# Patient Record
Sex: FEMALE | Race: WHITE
[De-identification: names, ages, dates, MRNs, and addresses within clinical notes are randomized per-mention and may not be internally consistent; named-entity substitution may affect disease eponyms.]

---

## 2017-08-22 ENCOUNTER — HOSPITAL ENCOUNTER (OUTPATIENT)
Dept: HOSPITAL 62 - RAD | Age: 27
End: 2017-08-22
Attending: PHYSICIAN ASSISTANT
Payer: OTHER GOVERNMENT

## 2017-08-22 DIAGNOSIS — M25.561: Primary | ICD-10-CM

## 2017-08-22 NOTE — RADIOLOGY REPORT (SQ)
EXAM DESCRIPTION:  MRI RT LOWER JOINT WITHOUT



COMPLETED DATE/TIME:  8/22/2017 8:29 am



REASON FOR STUDY:  RIGHT KNEE PAIN (M25.561) M25.561  PAIN IN RIGHT KNEE



COMPARISON:  Right knee four views 8/22/2017



TECHNIQUE:  Rightknee images acquired and stored on PACS.  Multiplanar images include fat sensitive s
equences as T1, water sensitive sequences as FST2 or STIR, cartilage sensitive sequences as FSPD, and
 gradient echo sequences.



LIMITATIONS:  Motion artifact



FINDINGS:  JOINT AND BURSAE: No effusion.

BONE CORTEX AND MARROW: On sagittal image 8, axial image 6, and coronal image 13, a benign-appearing 
chondroid lesion is present in the distal right femoral diaphysis, 1.6 cm transverse by 2.3 cm cranio
caudad by 1.5 cm AP.  Minimal matrix calcification on accompanying right knee films today.  This is p
robably a benign chondroid lesion.  Bone scan could be useful for followup.

ACL: Thin, question prior ACL injury

PCL: Intact.

MCL: Intact. No periligamentous edema or fluid.

LCL: Intact. No periligamentous edema or fluid.

MEDIAL MENISCUS: Complex tear mid body and posterior horn medial meniscus.

LATERAL MENISCUS: No tears. No abnormal signal.

MEDIAL COMPARTMENT: Cartilage preserved. No bone bruises or reactive marrow edema. No osteophytes.

LATERAL COMPARTMENT: Cartilage preserved. No bone bruises or reactive marrow edema. No osteophytes.

PATELLA: No chondromalacia. No subchondral cysts. Medial and lateral retinacula intact.

EXTENSOR MECHANISM: Intact. Quadriceps and patella tendons normal.

SOFT TISSUES: Adjacent muscles and subcutaneous tissues normal.  Normal flow void in popliteal artery
 and vein.

OTHER: No other significant finding.



IMPRESSION:  Benign-appearing chondroid lesion in the distal right femoral diaphysis.  Consider bone 
scan for followup.

Thin ACL, suspect prior injury

Complex tear mid body and posterior horn medial meniscus without parameniscal cyst



TECHNICAL DOCUMENTATION:  JOB ID:  [id number]

 [employer]- All Rights Reserved

## 2017-08-22 NOTE — RADIOLOGY REPORT (SQ)
EXAM DESCRIPTION:  KNEE RIGHT 4 VIEWS



COMPLETED DATE/TIME:  8/22/2017 8:32 am



REASON FOR STUDY:  RIGHT KNEE PAIN M25.561  PAIN IN RIGHT KNEE



COMPARISON:  None.



NUMBER OF VIEWS:  Four views.



TECHNIQUE:  AP, lateral, and both oblique radiographic images acquired of the right knee.



LIMITATIONS:  None.



FINDINGS:  MINERALIZATION: Normal.

BONES: No acute fracture or dislocation. No worrisome bone lesions. No significant osteophytes.

JOINT: No effusion.  No chondrocalcinosis.

OTHER: No other significant finding.



IMPRESSION:  NEGATIVE STUDY OF THE RIGHT KNEE. NO EXPLANATION FOR PAIN.



TECHNICAL DOCUMENTATION:  JOB ID:  [id number]

 2011 [employer]- All Rights Reserved

## 2018-01-06 ENCOUNTER — HOSPITAL ENCOUNTER (EMERGENCY)
Dept: HOSPITAL 62 - ER | Age: 28
Discharge: HOME | End: 2018-01-06
Payer: SELF-PAY

## 2018-01-06 VITALS — SYSTOLIC BLOOD PRESSURE: 131 MMHG | DIASTOLIC BLOOD PRESSURE: 74 MMHG

## 2018-01-06 DIAGNOSIS — F17.200: ICD-10-CM

## 2018-01-06 DIAGNOSIS — Z86.14: ICD-10-CM

## 2018-01-06 DIAGNOSIS — R10.2: Primary | ICD-10-CM

## 2018-01-06 LAB
BACTERIA (WET MOUNT): (no result)
CHLAM PCR: DETECTED
EPITHELIALS (WET MOUNT): (no result)
GON PCR: NOT DETECTED
T.VAGINALIS (WET MOUNT): (no result)
WBCS (WET MOUNT): (no result)
YEAST (WET MOUNT): (no result)

## 2018-01-06 PROCEDURE — 99283 EMERGENCY DEPT VISIT LOW MDM: CPT

## 2018-01-06 PROCEDURE — 87210 SMEAR WET MOUNT SALINE/INK: CPT

## 2018-01-06 PROCEDURE — 87591 N.GONORRHOEAE DNA AMP PROB: CPT

## 2018-01-06 PROCEDURE — 87491 CHLMYD TRACH DNA AMP PROBE: CPT

## 2018-01-06 NOTE — ER DOCUMENT REPORT
ED Medical Screen (RME)





- General


Chief Complaint: Foreign Body in Vagina


Stated Complaint: FOREIGN BODY IN VAGINA


Time Seen by Provider: 01/06/18 18:00


Notes: 





Retained tampon.  Lower abdominal pain with malodorous discharge.





I have greeted and performed a rapid initial assessment of this patient.  A 

comprehensive ED assessment and evaluation of the patient, analysis of test 

results and completion of the medical decision making process will be conducted 

by additional ED providers.


TRAVEL OUTSIDE OF THE [place] IN LAST 30 DAYS: No





- Related Data


Allergies/Adverse Reactions: 


 





sulfamethoxazole [From Bactrim] Allergy (Verified 01/06/18 17:11)


 


trimethoprim [From Bactrim] Allergy (Verified 01/06/18 17:11)


 











Past Medical History





- Past Medical History


Cardiac Medical History: 


   Denies: Hx Coronary Artery Disease, Hx Heart Attack, Hx Hypertension


Pulmonary Medical History: 


   Denies: Hx Asthma, Hx Bronchitis, Hx COPD


Neurological Medical History: Reports: Hx Migraine.  Denies: Hx Cerebrovascular 

Accident, Hx Seizures


Renal/ Medical History: Denies: Hx Peritoneal Dialysis


Musculoskeltal Medical History: Denies Hx Arthritis, Reports Hx Musculoskeletal 

Trauma


Skin Medical History: Reports Hx Cellulitis, Reports Hx MRSA


Traumatic Medical History: Reports: Hx Fractures


Past Surgical History: Reports: Hx Breast Surgery - augmentation, Hx Orthopedic 

Surgery - left arm.  Denies: Hx Pacemaker





- Immunizations


Immunizations up to date: Yes


Hx Diphtheria, Pertussis, Tetanus Vaccination: Yes





Physical Exam





- Vital signs


Vitals: 





 











Temp Pulse Resp BP Pulse Ox


 


 98.0 F   68   14   118/70   99 


 


 01/06/18 17:20  01/06/18 17:20  01/06/18 17:20  01/06/18 17:20  01/06/18 17:20














Course





- Vital Signs


Vital signs: 





 











Temp Pulse Resp BP Pulse Ox


 


 98.0 F   68   14   118/70   99 


 


 01/06/18 17:20  01/06/18 17:20  01/06/18 17:20  01/06/18 17:20  01/06/18 17:20

## 2018-02-17 ENCOUNTER — HOSPITAL ENCOUNTER (EMERGENCY)
Dept: HOSPITAL 62 - ER | Age: 28
Discharge: HOME | End: 2018-02-17
Payer: SELF-PAY

## 2018-02-17 VITALS — DIASTOLIC BLOOD PRESSURE: 67 MMHG | SYSTOLIC BLOOD PRESSURE: 113 MMHG

## 2018-02-17 DIAGNOSIS — Z86.14: ICD-10-CM

## 2018-02-17 DIAGNOSIS — Z88.2: ICD-10-CM

## 2018-02-17 DIAGNOSIS — Z88.3: ICD-10-CM

## 2018-02-17 DIAGNOSIS — O21.9: ICD-10-CM

## 2018-02-17 DIAGNOSIS — O20.9: Primary | ICD-10-CM

## 2018-02-17 LAB
ADD MANUAL DIFF: NO
ALBUMIN SERPL-MCNC: 4.4 G/DL (ref 3.5–5)
ALP SERPL-CCNC: 60 U/L (ref 38–126)
ALT SERPL-CCNC: 36 U/L (ref 9–52)
ANION GAP SERPL CALC-SCNC: 12 MMOL/L (ref 5–19)
APPEARANCE UR: (no result)
APTT PPP: YELLOW S
AST SERPL-CCNC: 20 U/L (ref 14–36)
BASOPHILS # BLD AUTO: 0.1 10^3/UL (ref 0–0.2)
BASOPHILS NFR BLD AUTO: 0.8 % (ref 0–2)
BILIRUB DIRECT SERPL-MCNC: 0.4 MG/DL (ref 0–0.4)
BILIRUB SERPL-MCNC: 0.4 MG/DL (ref 0.2–1.3)
BILIRUB UR QL STRIP: NEGATIVE
BUN SERPL-MCNC: 8 MG/DL (ref 7–20)
CALCIUM: 10.1 MG/DL (ref 8.4–10.2)
CHLORIDE SERPL-SCNC: 104 MMOL/L (ref 98–107)
CO2 SERPL-SCNC: 22 MMOL/L (ref 22–30)
EOSINOPHIL # BLD AUTO: 0.2 10^3/UL (ref 0–0.6)
EOSINOPHIL NFR BLD AUTO: 1.9 % (ref 0–6)
ERYTHROCYTE [DISTWIDTH] IN BLOOD BY AUTOMATED COUNT: 13 % (ref 11.5–14)
GLUCOSE SERPL-MCNC: 84 MG/DL (ref 75–110)
GLUCOSE UR STRIP-MCNC: NEGATIVE MG/DL
HCT VFR BLD CALC: 41 % (ref 36–47)
HGB BLD-MCNC: 14 G/DL (ref 12–15.5)
KETONES UR STRIP-MCNC: NEGATIVE MG/DL
LYMPHOCYTES # BLD AUTO: 2.3 10^3/UL (ref 0.5–4.7)
LYMPHOCYTES NFR BLD AUTO: 26.9 % (ref 13–45)
MCH RBC QN AUTO: 29.4 PG (ref 27–33.4)
MCHC RBC AUTO-ENTMCNC: 34.1 G/DL (ref 32–36)
MCV RBC AUTO: 86 FL (ref 80–97)
MONOCYTES # BLD AUTO: 0.6 10^3/UL (ref 0.1–1.4)
MONOCYTES NFR BLD AUTO: 7.3 % (ref 3–13)
NEUTROPHILS # BLD AUTO: 5.4 10^3/UL (ref 1.7–8.2)
NEUTS SEG NFR BLD AUTO: 63.1 % (ref 42–78)
NITRITE UR QL STRIP: NEGATIVE
PH UR STRIP: 6 [PH] (ref 5–9)
PLATELET # BLD: 223 10^3/UL (ref 150–450)
POTASSIUM SERPL-SCNC: 4.1 MMOL/L (ref 3.6–5)
PROT SERPL-MCNC: 7.8 G/DL (ref 6.3–8.2)
PROT UR STRIP-MCNC: NEGATIVE MG/DL
RBC # BLD AUTO: 4.77 10^6/UL (ref 3.72–5.28)
SODIUM SERPL-SCNC: 138.2 MMOL/L (ref 137–145)
SP GR UR STRIP: 1.01
TOTAL CELLS COUNTED % (AUTO): 100 %
UROBILINOGEN UR-MCNC: 4 MG/DL (ref ?–2)
WBC # BLD AUTO: 8.6 10^3/UL (ref 4–10.5)

## 2018-02-17 PROCEDURE — 84702 CHORIONIC GONADOTROPIN TEST: CPT

## 2018-02-17 PROCEDURE — 86850 RBC ANTIBODY SCREEN: CPT

## 2018-02-17 PROCEDURE — 36415 COLL VENOUS BLD VENIPUNCTURE: CPT

## 2018-02-17 PROCEDURE — 86901 BLOOD TYPING SEROLOGIC RH(D): CPT

## 2018-02-17 PROCEDURE — 99284 EMERGENCY DEPT VISIT MOD MDM: CPT

## 2018-02-17 PROCEDURE — 76817 TRANSVAGINAL US OBSTETRIC: CPT

## 2018-02-17 PROCEDURE — 86900 BLOOD TYPING SEROLOGIC ABO: CPT

## 2018-02-17 PROCEDURE — 80053 COMPREHEN METABOLIC PANEL: CPT

## 2018-02-17 PROCEDURE — 85025 COMPLETE CBC W/AUTO DIFF WBC: CPT

## 2018-02-17 PROCEDURE — 81001 URINALYSIS AUTO W/SCOPE: CPT

## 2018-02-17 NOTE — ER DOCUMENT REPORT
ED GI/





- General


Mode of Arrival: Ambulatory


Information source: Patient


TRAVEL OUTSIDE OF THE [place] IN LAST 30 DAYS: No





- HPI


Patient complains to provider of: Pelvic pain, Pregnant, Vaginal bleeding


Onset: Other - States bleeding started earlier today did not even soak 1 pad


Timing/Duration: Better


Quality of pain: Cramping


Severity at maximum: Mild


Severity in ED: None


Pain Level: Denies


Location: Pelvis


Vaginal bleeding (Compared to normal period): Spotting


LMP: 12/15/2017


: 1


Para: 0


ABO type: A


Rh factor: Negative


OB ultrasound done: Yes


Sexual history: Active


Associated symptoms: Other - Mild vaginal bleeding mild cramping


Exacerbated by: Denies


Relieved by: Denies


Similar symptoms previously: No


Recently seen / treated by doctor: Yes





<KYLEE BHAT - Last Filed: 18 05:58>





<DEMETRIUS LUO - Last Filed: 18 19:54>





- General


Chief Complaint: Vaginal Bleeding


Stated Complaint: VAGINAL BLEEDING


Time Seen by Provider: 18 19:28


Notes: 





87-year-old female presented to ED for vaginal bleeding cramping nausea and 

vomiting during early pregnancy.  She states she was told she was between 9 and 

10 weeks pregnant.  States her last menstrual period was on December 15, 2017 

she is a first-time pregnancy.  She states she has been having spotting but no 

heavy bleeding.  She states she has had nausea and vomiting not able to keep 

anything down. (KYLEE BHAT)





Patient is 27 year old, not 87.  (DEMETRIUS LUO)





- Related Data


Allergies/Adverse Reactions: 


 





Sulfa (Sulfonamide Antibiotics) Allergy (Verified 18 18:17)


 


sulfamethoxazole [From Bactrim] Allergy (Verified 18 17:11)


 


trimethoprim [From Bactrim] Allergy (Verified 18 17:11)


 











Past Medical History





- General


Information source: Patient





- Social History


Smoking Status: Former Smoker


Cigarette use (# per day): No


Chew tobacco use (# tins/day): No


Smoking Education Provided: No


Frequency of alcohol use: None


Drug Abuse: None


Lives with: Family


Family History: Hyperlipidemia, Hypertension, Malignancy.  denies: Arthritis, 

CAD, COPD, CVA, DM, Thyroid Disfunction


Patient has suicidal ideation: No


Patient has homicidal ideation: No





- Past Medical History


Cardiac Medical History: Reports: None


Pulmonary Medical History: Reports: None


EENT Medical History: Reports: None


Neurological Medical History: Reports: Hx Migraine


Endocrine Medical History: Reports: None


Renal/ Medical History: Reports: None


Malignancy Medical History: Reports: None


GI Medical History: Reports: None


Musculoskeltal Medical History: Reports Hx Musculoskeletal Trauma


Skin Medical History: Reports Hx Cellulitis, Reports Hx MRSA


Psychiatric Medical History: Reports: None


Traumatic Medical History: Reports: Hx Fractures


Infectious Medical History: Reports: None


Past Surgical History: Reports: Hx Breast Surgery - augmentation, Hx Orthopedic 

Surgery - left arm





- Immunizations


Immunizations up to date: Yes


Hx Diphtheria, Pertussis, Tetanus Vaccination: Yes





<KYLEE BAHT - Last Filed: 18 05:58>





Review of Systems





<KYLEE BHAT - Last Filed: 18 05:58>





<DEMETRIUS LUO - Last Filed: 18 19:54>





- Review of Systems


Notes: 





Constitutional: [PRESENT: as per HPI.  ABSENT: chills, fever(s), headache(s), 

weight gain, weight loss]


Eyes: [ABSENT: visual disturbances]


Ears: [ABSENT: hearing changes]


Cardiovascular: [ABSENT: chest pain, dyspnea on exertion, edema, orthropnea, 

palpitations]


Respiratory: [ABSENT: cough, hemoptysis]


Gastrointestinal: [ABSENT: abdominal pain, constipation, diarrhea, hematemesis, 

hematochezia, nausea, vomiting]


Genitourinary: Mild vaginal bleeding, pelvic pain, pregnant


Musculoskeletal: [ABSENT: joint swelling]


Integumentary: [ABSENT: rash, wounds]


Neurological: [ABSENT: abnormal gait, abnormal speech, confusion, dizziness, 

focal weakness, syncope]


Psychiatric: [ABSENT: anxiety, depression, homicidal ideation, suicidal ideation

]


Endocrine: [ABSENT: cold intolerance, heat intolerance, menstrual abnormalities

, polydipsia, polyuria]


Hematologic/Lymphatic: [ABSENT: easy bleeding, easy bruising, lymphadenopathy] (

KYLEE BHAT)





Physical Exam





<KYLEE BHAT - Last Filed: 18 05:58>





<DEMETRIUS LUO - Last Filed: 18 19:54>





- Vital signs


Vitals: 





 











Temp Pulse Resp BP Pulse Ox


 


 98.1 F   74   16   117/66   100 


 


 18 18:43  18 18:43  18 18:43  18 18:43  18 18:43














- Notes


Notes: 





PHYSICAL EXAMINATION:





GENERAL: Well-appearing, well-nourished and in no acute distress.





HEAD: Atraumatic, normocephalic.





EYES: Pupils equal round and reactive to light, extraocular movements intact, 

conjunctiva are normal.





ENT: Nares patent, oropharynx clear without exudates.  Moist mucous membranes.





NECK: Normal range of motion, supple without lymphadenopathy





LUNGS: Breath sounds clear to auscultation bilaterally and equal.  No wheezes 

rales or rhonchi.





HEART: Regular rate and rhythm without murmurs





ABDOMEN: Soft, nontender, nondistended abdomen.  No guarding, no rebound.  No 

masses appreciated.





Female : deferred she stated bleeding had stopped she was not having any more 

pain.





Musculoskeletal: Normal range of motion, no pitting or edema.  No cyanosis.





NEUROLOGICAL: Cranial nerves grossly intact.  Normal speech, normal gait.  

Normal sensory, motor exams





PSYCH: Normal mood, normal affect.





SKIN: Warm, Dry, normal turgor, no rashes or lesions noted. (KYLEE BHAT)





Course





- Laboratory


Result Diagrams: 


 18 19:48





 18 19:48





- Diagnostic Test


Radiology reviewed: Image reviewed, Reports reviewed





<KYLEE BHAT - Last Filed: 18 05:58>





- Laboratory


Result Diagrams: 


 18 19:48





 18 19:48





<DEMETRIUS LUO - Last Filed: 18 19:54>





- Re-evaluation


Re-evalutation: 





18 06:03


Labs and ultrasound discussed with patient.  Patient is A- blood type so she 

was received RhoGam with no complications.  Patient was instructed to follow-up 

with her OB/GYN.  She stated that her bleeding had stopped before I saw her in 

the emergency room.  Patient received instructions on when to follow-up with 

the emergency room, what medication she could and could not take, and 

medication she could use for nausea and vomiting.  Patient verbalized 

understanding of her instructions.  When asked did she have any further 

questions or concerns she replied she did not. (KYLEE BHAT)





- Vital Signs


Vital signs: 





 











Temp Pulse Resp BP Pulse Ox


 


 98.4 F   68   16   113/67   100 


 


 18 21:10  18 21:10  18 21:10  18 21:10  18 21:10














- Laboratory


Laboratory results interpreted by me: 





 











  18





  19:48 19:48


 


Beta HCG, Quant  51902.00 H 


 


Urine Urobilinogen   4.0 H














Discharge





<KYLEE BHAT - Last Filed: 18 05:58>





<DEMETRIUS LUO - Last Filed: 18 19:54>





- Discharge


Clinical Impression: 


 Vaginal bleeding affecting early pregnancy





Condition: Stable


Disposition: HOME, SELF-CARE


Instructions:  [facility]


Additional Instructions: 


PREGNANCY:





     You are pregnant.  Prenatal care is best started as early in pregnancy as 

possible.


     If you're unsure about continuing this pregnancy, you should discuss this 

with your physician or with counsellors at [facility].


     You should take only medications approved by your physician. Acetaminophen 

can safely be taken for minor pains.  As a rule, medication for chronic 

conditions such as asthma or seizures can safely be continued.  You should 

discuss with the physician every medicine you take.


     Any regular exercise program can be continued.  Talk to your physician, 

however, before engaging in competitive or demanding sports.


     Alcohol, smoking, and "street drugs" are dangerous to your baby. Cocaine 

is especially dangerous.  Don't use any illicit drugs!








BLEEDING DURING EARLY PREGNANCY:





     You have been evaluated for passing blood while pregnant. While we take 

this symptom very seriously, most women with your degree of bleeding will go on 

to have a perfectly normal baby. At this time, there is no indication that a 

miscarriage will occur. (A miscarriage occurs when the fetus is abnormal.  

There is no medicine or treatment to prevent it.)


     A more serious cause of bleeding is tubal (or ectopic) pregnancy. An 

ultrasound usually can show whether the pregnancy is in the uterus or in the 

tube. Sometimes in early pregnancy, no fetus is seen. In this case, careful 

follow-up, including repeat blood tests and repeat ultrasound, is necessary.


     Do not douche or have sex for at least a week, or until OK'd by the 

doctor. Don't use tampons.


     Call the doctor or return for re-examination if there is an increase in 

bleeding or cramping, extreme weakness, fainting, new abdominal pain, fever, or 

passage of tissue.








RHOGAM:


   Rhogam is given to a pregnant woman who has Rh negative blood type when she 

has vaginal bleeding during her pregnancy or at the time of the delivery of her 

baby.  If a woman is Rh negative, her body will form antibodies against red 

blood cells from an Rh positive fetus or baby that mix with her blood during a 

threatened or actual miscarraige or delivery.  These antibodies will remain in 

the woman's body forever and will attack any future Rh positive fetus 

preventing it from developing into a normal baby.  Rhogam is given to prevent 

the mother's body from forming these antibodies.





Vitamin B6 can help with your nausea you can buy that over-the-counter.





Copy of your labs and ultrasound were given to you to follow-up with the health 

department.





FOLLOW-UP CARE:


If you have been referred to a physician for follow-up care, call the physician

s office for an appointment as you were instructed or within the next two days.

  If you experience worsening or a significant change in your symptoms (very 

heavy bleeding with large clots of blood, passage of tissue, more severe 

abdominal / pelvic pain or cramping, feeling faint or severe weakness, fever, 

etc.), notify the physician immediately or return to the Emergency Department 

at any time for re-evaluation.





OBSTETRIC-GYNECOLOGIC (OB-GYN) PHYSICIANS IN [place]:








[facility]


    [address] [place] [address]


    [phone]





For active duty and  dependents diagnosed with a threatened  or 

miscarriage, you should follow up in the following manner:





      Standard patients who have a local civilian provider should follow 

up with that provider.





      Patients of the Family Practice Clinic should call your Team Nurse at 8:

00 am the following morning for further instructions.





     If you are neither a  Standard patient nor a patient of the Family 

Practice Clinic, you should follow up at the [facility] ([facility])

.  Patients already enrolled in the [facility],  Prime patients not 

assigned to the Family Practice Clinic, and Active Duty patients not assigned 

to Family Practice Clinic should report to the [facility] at 8:00 am the next 

morning that the [facility] is open and then you will be seen in the OB 

Clinic at 11:00 am.


Forms:  Return to Work

## 2018-02-17 NOTE — RADIOLOGY REPORT (SQ)
EXAM DESCRIPTION:  U/S OB TRANSVAGINAL W/O DOP



COMPLETED DATE/TIME:  2/17/2018 8:16 pm



REASON FOR STUDY:  Pregnant vaginal bleeding cramping first pregnancy



COMPARISON:  None.



TECHNIQUE:  Transvaginal static and realtime grayscale images acquired of the pelvis. Additional charles
cted spectral and color Doppler images recorded. All images stored on PACs.

bHCG: Pending.



LIMITATIONS:  None.



FINDINGS:  FETUS: Living intrauterine pregnancy.

EGA: 6 week 6 day.

STEPHANIA: 10/7/2018.

FHR: 136  beats per minute.

SUBCHORIONIC BLEED: No.

SIZE OF BLEED: Not applicable.

UTERUS: No masses. No anomalies.

CERVICAL LENGTH: 2.8 cm.   Closed.

RIGHT ADNEXA: Ovary not identified.

No adnexal free fluid.

No adnexal masses.

LEFT ADNEXA: Normal ovary with normal vascular flow.

No adnexal free fluid.

No adnexal masses.

FREE FLUID: Trace cul-de-sac fluid.

OTHER: No other significant finding.



IMPRESSION:  LIVING INTRAUTERINE PREGNANCY.

EGA 6 week 6 day.

Trimester of pregnancy:  First - 0 to 13 weeks.



TECHNICAL DOCUMENTATION:  JOB ID:  [id number]

 2011 [employer]- All Rights Reserved

## 2018-02-17 NOTE — ER DOCUMENT REPORT
ED Medical Screen (RME)





- General


Chief Complaint: Vaginal Bleeding


Stated Complaint: VAGINAL BLEEDING


Time Seen by Provider: 18 19:28


Mode of Arrival: Ambulatory


Information source: Patient


Notes: 





27-year-old female presents to ED for vaginal bleeding cramping nausea and 

vomiting during early pregnancy.  She states she has been told she is between 9 

and 10 weeks pregnant.  Last menstrual period was December 15.   1 para 

0.  She states she has had spotting no heavy bleeding is not able to keep any 

food down dry heaving nausea.  She states this is his first pregnancy so she is 

not sure before she is feeling was normal but she "just does not feel right".











I have greeted and performed a rapid initial assessment of this patient.  A 

comprehensive ED assessment and evaluation of the patient, analysis of test 

results and completion of medical decision making process will be conducted by 

an additional ED providers.


TRAVEL OUTSIDE OF THE [place] IN LAST 30 DAYS: No





- Related Data


Allergies/Adverse Reactions: 


 





Sulfa (Sulfonamide Antibiotics) Allergy (Verified 18 18:17)


 


sulfamethoxazole [From Bactrim] Allergy (Verified 18 17:11)


 


trimethoprim [From Bactrim] Allergy (Verified 18 17:11)


 











Past Medical History





- Past Medical History


Cardiac Medical History: 


   Denies: Hx Coronary Artery Disease, Hx Heart Attack, Hx Hypertension


Pulmonary Medical History: 


   Denies: Hx Asthma, Hx Bronchitis, Hx COPD


Neurological Medical History: Reports: Hx Migraine.  Denies: Hx Cerebrovascular 

Accident, Hx Seizures


Renal/ Medical History: Denies: Hx Peritoneal Dialysis


Musculoskeltal Medical History: Denies Hx Arthritis, Reports Hx Musculoskeletal 

Trauma


Skin Medical History: Reports Hx Cellulitis, Reports Hx MRSA


Traumatic Medical History: Reports: Hx Fractures


Past Surgical History: Reports: Hx Breast Surgery - augmentation, Hx Orthopedic 

Surgery - left arm.  Denies: Hx Pacemaker





- Immunizations


Immunizations up to date: Yes


Hx Diphtheria, Pertussis, Tetanus Vaccination: Yes





Physical Exam





- Vital signs


Vitals: 





 











Temp Pulse Resp BP Pulse Ox


 


 98.1 F   74   16   117/66   100 


 


 18 18:43  18 18:43  18 18:43  18 18:43  18 18:43














Course





- Vital Signs


Vital signs: 





 











Temp Pulse Resp BP Pulse Ox


 


 98.1 F   74   16   117/66   100 


 


 18 18:43  18 18:43  18 18:43  18 18:43  18 18:43

## 2018-06-03 ENCOUNTER — HOSPITAL ENCOUNTER (EMERGENCY)
Dept: HOSPITAL 62 - ER | Age: 28
Discharge: HOME | End: 2018-06-03
Payer: SELF-PAY

## 2018-06-03 VITALS — DIASTOLIC BLOOD PRESSURE: 69 MMHG | SYSTOLIC BLOOD PRESSURE: 115 MMHG

## 2018-06-03 DIAGNOSIS — Z86.14: ICD-10-CM

## 2018-06-03 DIAGNOSIS — F17.200: ICD-10-CM

## 2018-06-03 DIAGNOSIS — G44.211: Primary | ICD-10-CM

## 2018-06-03 PROCEDURE — 99284 EMERGENCY DEPT VISIT MOD MDM: CPT

## 2018-06-03 PROCEDURE — 81025 URINE PREGNANCY TEST: CPT

## 2018-06-03 NOTE — ER DOCUMENT REPORT
HPI





- HPI


Pain Level: 2


Context: 





Patient is a 27-year-old female who presents emergency department the chief 

complaint of headaches and stress.  Patient states that she checked in today 

for a work note.  Patient states that her job told her she needs to be 

evaluated so she does not lose her job.  She states that she has these 

headaches frequently and that she has a history of tension headaches.  Patient 

states that she takes Tylenol for them in the past with complete resolution.  

Denies any vision changes, associated nausea, vomiting.  Regarding her noted 

complaint of stomach pain.  Patient states that she has had some intermittent 

nausea but she states that this is mostly when she skips meals at work and 

related to stress.  She denies any abdominal pain.





- REPRODUCTIVE


Reproductive: DENIES: Pregnant:





Past Medical History





- Social History


Smoking Status: Current Every Day Smoker


Family History: Hyperlipidemia, Hypertension, Malignancy.  denies: Arthritis, 

CAD, COPD, CVA, DM, Thyroid Disfunction





- Past Medical History


Cardiac Medical History: 


   Denies: Hx Coronary Artery Disease, Hx Heart Attack, Hx Hypertension


Pulmonary Medical History: 


   Denies: Hx Asthma, Hx Bronchitis, Hx COPD


Neurological Medical History: Reports: Hx Migraine.  Denies: Hx Cerebrovascular 

Accident, Hx Seizures


Renal/ Medical History: Denies: Hx Peritoneal Dialysis


Musculoskeltal Medical History: Denies Hx Arthritis, Reports Hx Musculoskeletal 

Trauma


Skin Medical History: Reports Hx Cellulitis, Reports Hx MRSA


Traumatic Medical History: Reports: Hx Fractures


Past Surgical History: Reports: Hx Breast Surgery - augmentation, Hx Orthopedic 

Surgery - left arm.  Denies: Hx Pacemaker





- Immunizations


Immunizations up to date: Yes


Hx Diphtheria, Pertussis, Tetanus Vaccination: Yes





Vertical Provider Document





- CONSTITUTIONAL


Agree With Documented VS: Yes


Notes: 





PHYSICAL EXAM


GENERAL: Alert, interacts well. 


Head: Atraumatic normocephalic


ABDOMEN: Soft,  nondistended, nontender. No guarding, rebound, or rigidity.. 

Bowel sounds present in all 4 quadrants.


EXTREMITIES: Moves all 4 extremities spontaneously. No edema, radial and 

dorsalis pedis pulses 2/4 bilaterally. No cyanosis. 


NEUROLOGICAL: Alert and oriented x4.  Extraocular motions intact.  Pupils are 2 

mm and equally reactive.  Normal speech, normal gait.  5 out of 5 strength in 

both the distal and proximal upper and lower extremities bilaterally.  

Sensation is grossly intact throughout.  


PSYCH: Normal affect, normal mood.


SKIN: Warm, dry, normal turgor. No rashes or lesions noted.








- INFECTION CONTROL


TRAVEL OUTSIDE OF THE [place] IN LAST 30 DAYS: No





Course





- Re-evaluation


Re-evalutation: 





06/03/18 12:50


Patient does not have any focal neurologic deficits, nuchal rigidity, vital 

signs are within normal limits no papilledema.  Patient is otherwise no acute 

distress and hemodynamically stable.  Low index for suspicion of acute 

subarachnoid hemorrhage, meningitis or mass.  Low suspicion for acute life-

threatening etiology with intact neuro exam therefore no additional imaging or 

laboratory testing is indicated.  Will discharge patient home with strict follow

-up with PCP











- Vital Signs


Vital signs: 


 











Temp Pulse Resp BP Pulse Ox


 


 98.2 F   84   16   115/69   99 


 


 06/03/18 12:46  06/03/18 12:46  06/03/18 12:46  06/03/18 12:46  06/03/18 12:46














Discharge





- Discharge


Clinical Impression: 


Headache


Qualifiers:


 Headache type: tension-type Headache chronicity pattern: episodic headache 

Intractability: intractable Qualified Code(s): G44.211 - Episodic tension-type 

headache, intractable





Condition: Good


Disposition: HOME, SELF-CARE


Additional Instructions: 


HEADACHE:





     The physician does not feel that the headache you are experiencing has a 

serious underlying cause.  Most headaches are due to emotional stress, with 

resultant muscle tension (tension headache). Occasionally, headaches are 

secondary to changes in the blood vessels of the scalp (vascular headache and 

migraine headache).  Sometimes, a headache is the first symptom of another 

developing illness, such as a viral infection.  You have no evidence of stroke, 

bleeding, meningitis, or other serious cause of your headache.


     The treatment of headaches varies with the severity and cause of the pain.

  Not all headaches need pain shots.  In fact, there is evidence that using 

narcotics for headaches may make them worse in the long run.  The physician 

will determine the therapy that's in your best interest.


     If you develop a fever, if the headache is different from any you've 

previously experienced, or if the headache progressively worsens, then call 

your physician at once or go to the emergency room.








FOLLOW-UP CARE:


If you have been referred to a physician for follow-up care, call the physician

s office for an appointment as you were instructed or within the next two days.

  If you experience worsening or a significant change in your symptoms, notify 

the physician immediately or return to the Emergency Department at any time for 

re-evaluation.


Prescriptions: 


Butalb/Acetaminophen/Caffeine [Fioricet (-40 mg) Tablet] 1 - 2 tab PO Q4H 

#20 tab


Forms:  Return to Work

## 2018-06-14 ENCOUNTER — HOSPITAL ENCOUNTER (EMERGENCY)
Dept: HOSPITAL 62 - ER | Age: 28
Discharge: HOME | End: 2018-06-14
Payer: COMMERCIAL

## 2018-06-14 VITALS — SYSTOLIC BLOOD PRESSURE: 135 MMHG | DIASTOLIC BLOOD PRESSURE: 78 MMHG

## 2018-06-14 DIAGNOSIS — S39.012A: Primary | ICD-10-CM

## 2018-06-14 DIAGNOSIS — F17.200: ICD-10-CM

## 2018-06-14 DIAGNOSIS — Z88.2: ICD-10-CM

## 2018-06-14 DIAGNOSIS — Z86.14: ICD-10-CM
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
## 2018-06-14 DIAGNOSIS — Z88.3: ICD-10-CM

## 2018-06-14 DIAGNOSIS — M54.6: ICD-10-CM

## 2018-06-14 PROCEDURE — 72110 X-RAY EXAM L-2 SPINE 4/>VWS: CPT

## 2018-06-14 PROCEDURE — 99283 EMERGENCY DEPT VISIT LOW MDM: CPT

## 2018-06-14 PROCEDURE — 72070 X-RAY EXAM THORAC SPINE 2VWS: CPT
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
## 2018-09-30 NOTE — ER DOCUMENT REPORT
HPI





- HPI


Patient complains to provider of: Dental pain


Onset: Other - 3 days


Onset/Duration: Persistent


Quality of pain: Achy


Pain Level: 4


Context: 





Patient presents complaining of a 3-day history of dental pain to her wisdom 

teeth on the left upper and lower side.  Patient denies any fever.  Patient 

does complain of left ear pain.


Associated Symptoms: Other - Dental pain.  denies: Fever, Nausea, Vomiting


Exacerbated by: Denies


Relieved by: Denies


Similar symptoms previously: Yes


Recently seen / treated by doctor: No





- ROS


ROS below otherwise negative: Yes


Systems Reviewed and Negative: Yes All other systems reviewed and negative





- CONSTITUTIONAL


Constitutional: DENIES: Fever, Chills





- EENT


EENT: REPORTS: Ear Pain





- NEURO


Neurology: DENIES: Headache





- GASTROINTESTINAL


Gastrointestinal: DENIES: Patient vomiting





- REPRODUCTIVE


Reproductive: DENIES: Pregnant:





- DERM


Skin Color: Normal


Skin Problems: None





Past Medical History





- General


Information source: Patient





- Social History


Smoking Status: Never Smoker


Frequency of alcohol use: None


Drug Abuse: None


Occupation: Foodservice


Lives with: Family


Family History: Hyperlipidemia, Hypertension, Malignancy.  denies: Arthritis, 

CAD, COPD, CVA, DM, Thyroid Disfunction


Pulmonary Medical History: 


   Denies: Hx Asthma, Hx Bronchitis, Hx COPD


Neurological Medical History: Reports: Hx Migraine


Renal/ Medical History: Denies: Hx Peritoneal Dialysis


Musculoskeletal Medical History: Denies Hx Arthritis, Reports Hx 

Musculoskeletal Trauma


Skin Medical History: Reports Hx Cellulitis, Reports Hx MRSA


Traumatic Medical History: Reports: Hx Fractures


Past Surgical History: Reports: Hx Breast Surgery - augmentation, Hx Orthopedic 

Surgery - left arm





- Immunizations


Immunizations up to date: Yes


Hx Diphtheria, Pertussis, Tetanus Vaccination: Yes





Vertical Provider Document





- CONSTITUTIONAL


Agree With Documented VS: Yes


Exam Limitations: No Limitations


General Appearance: WD/WN, No Apparent Distress





- INFECTION CONTROL


TRAVEL OUTSIDE OF THE [place] IN LAST 30 DAYS: No





- HEENT


HEENT: Atraumatic, Normocephalic.  negative: Pharyngeal Exudate, Pharyngeal 

Tenderness, Pharyngeal Erythema, Tympanic Membrane Red, Tympanic Membrane 

Bulging


Mouth Diagram: 


  __________________________














  __________________________





 1 - Tenderness, with gingival inflammation, no obvious abscess.








- NECK


Neck: Normal Inspection, Supple.  negative: Lymphadenopathy-Left, 

Lymphadenopathy-Right





- RESPIRATORY


Respiratory: Breath Sounds Normal, No Respiratory Distress





- CARDIOVASCULAR


Cardiovascular: Regular Rate, Regular Rhythm





- BACK


Back: Normal Inspection





- MUSCULOSKELETAL/EXTREMETIES


Musculoskeletal/Extremeties: MAEW





- NEURO


Level of Consciousness: Awake, Alert, Appropriate


Motor/Sensory: No Motor Deficit





- DERM


Integumentary: Warm, Dry





Course





- Vital Signs


Vital signs: 


 











Temp Pulse Resp BP Pulse Ox


 


 98.7 F   69   20   128/79 H  100 


 


 09/30/18 20:17  09/30/18 20:17  09/30/18 20:17  09/30/18 20:17  09/30/18 20:17














Discharge





- Discharge


Clinical Impression: 


 Toothache





Condition: Stable


Disposition: HOME, SELF-CARE


Instructions:  Penicillin V K ([facility]), Toothache ([facility])


Additional Instructions: 


Return immediately for any new or worsening symptoms





Followup with your primary care provider, call tomorrow to make a followup 

appointment





Follow-up with a dental care provider for further evaluation


Prescriptions: 


Naproxen [Naprosyn 250 Nmg Tablet] 1 tab PO BID #14 tablet


Penicillin V Potassium [Penicillin Vk 500 mg Tablet] 500 mg PO BID #20 tablet


Referrals: 


[facility] [Provider Group] - Follow up as needed

## 2018-12-11 NOTE — ER DOCUMENT REPORT
ED Skin Rash/Insect Bite/Abscs





- General


Chief Complaint: Rash


Stated Complaint: RASH


Time Seen by Provider: 12/10/18 23:50


Mode of Arrival: Ambulatory


Information source: Patient, Relative


Notes: 


Patient is a 20-year-old female comes emergency room complaining of a 

generalized rash.  Patient states she works at [employer] noticed that yesterday 

at work she started breaking out on her right forearm and is now travel around 

to her left arm her upper legs and her abdomen and back.  Patient states she is 

taken Benadryl without any relief of the itching.  She does not know any 

association to anything that would cause this.  She only has an allergy to 

sulfa.  Currently patient states she is really itchy and the redness has turned 

to hives.  She denies any shortness of breath or any swelling of the lips or 

tongue.


TRAVEL OUTSIDE OF THE [place] IN LAST 30 DAYS: No





- HPI


Patient complains to provider of: Skin rash/lesion


Onset: Yesterday


Onset/Duration: Sudden


Quality of pain: Other - Itchy


Severity: Moderate


Pain Level: 3


Skin Character: Rash, Urticarial


Skin Temperature: Warm


Quality of rash: Itchy


Identify cause: No


Exacerbated by: Denies


Relieved by: Denies


Similar symptoms previously: No


Recently seen / treated by doctor: No





- Related Data


Allergies/Adverse Reactions: 


 





Sulfa (Sulfonamide Antibiotics) Allergy (Verified 09/30/18 19:59)


 


sulfamethoxazole [From Bactrim] Allergy (Verified 09/30/18 19:59)


 


trimethoprim [From Bactrim] Allergy (Verified 09/30/18 19:59)


 











Past Medical History





- General


Information source: Patient





- Social History


Smoking Status: Current Some Day Smoker


Cigarette use (# per day): Yes - Occasional


Chew tobacco use (# tins/day): No


Smoking Education Provided: Yes


Frequency of alcohol use: Social


Drug Abuse: None


Family History: Hyperlipidemia, Hypertension, Malignancy.  denies: Arthritis, 

CAD, COPD, CVA, DM, Thyroid Disfunction


Patient has suicidal ideation: No


Patient has homicidal ideation: No





- Past Medical History


Cardiac Medical History: 


   Denies: Hx Coronary Artery Disease, Hx Heart Attack, Hx Hypertension


Pulmonary Medical History: 


   Denies: Hx Asthma, Hx Bronchitis, Hx COPD


Neurological Medical History: Reports: Hx Migraine.  Denies: Hx Cerebrovascular 

Accident, Hx Seizures


Renal/ Medical History: Denies: Hx Peritoneal Dialysis


Musculoskeletal Medical History: Denies Hx Arthritis, Reports Hx 

Musculoskeletal Trauma


Skin Medical History: Reports Hx Cellulitis, Reports Hx MRSA


Traumatic Medical History: Reports: Hx Fractures


Past Surgical History: Reports: Hx Breast Surgery - augmentation, Hx Orthopedic 

Surgery - left arm.  Denies: Hx Pacemaker





- Immunizations


Immunizations up to date: Yes


Hx Diphtheria, Pertussis, Tetanus Vaccination: Yes





Review of Systems





- Review of Systems


Constitutional: No symptoms reported


EENT: No symptoms reported


Cardiovascular: No symptoms reported


Respiratory: No symptoms reported


Gastrointestinal: No symptoms reported


Genitourinary: No symptoms reported


Female Genitourinary: No symptoms reported


Musculoskeletal: No symptoms reported


Skin: See HPI, Rash


Hematologic/Lymphatic: No symptoms reported


Neurological/Psychological: No symptoms reported


-: Yes All other systems reviewed and negative





Physical Exam





- Vital signs


Vitals: 





 











Temp Pulse Resp BP Pulse Ox


 


 97.9 F   71   17   122/71   99 


 


 12/10/18 21:14  12/10/18 21:14  12/10/18 21:14  12/10/18 21:14  12/10/18 21:14











Interpretation: Normal





- Notes


Notes: 





PHYSICAL EXAMINATION:





GENERAL: Well-appearing, well-nourished and in no acute distress.  But 

uncomfortable appearing





HEAD: Atraumatic, normocephalic.





EYES: Pupils equal round and reactive to light, extraocular movements intact, 

conjunctiva are normal.





ENT: Nares patent, oropharynx clear without exudates.  Moist mucous membranes.  

No sign of swelling of the tongue or lips.  Patient handling secretions without 

problem.





NECK: Normal range of motion, supple without lymphadenopathy





LUNGS: Breath sounds clear to auscultation bilaterally and equal.  No wheezes 

rales or rhonchi.





HEART: Regular rate and rhythm without murmurs





ABDOMEN: Soft, nontender, nondistended abdomen.  No guarding, no rebound.  No 

masses appreciated.





Female : deferred





Musculoskeletal: Normal range of motion, no pitting or edema.  No cyanosis.





NEUROLOGICAL:   Normal speech, normal gait.  Normal sensory, motor exams





PSYCH: Normal mood, normal affect.





SKIN: Warm, further examination of patient's rash shows her to be ureteric type 

of a presentation more on the right arm and left arm upper thighs and some on 

the back.  It is starting to fade currently and is at times hard to 

differentiate between that and patient's natural skin tone.





Course





- Re-evaluation


Re-evalutation: 





12/11/18 00:46


We gave patient the Decadron, Benadryl, and Pepcid here.  She responded fairly 

well itching got slightly better.  I am going to send her home on a steroid 

taper some Zantac and Atarax.





- Vital Signs


Vital signs: 





 











Temp Pulse Resp BP Pulse Ox


 


 97.9 F   71   17   122/71   99 


 


 12/10/18 21:14  12/10/18 21:14  12/10/18 21:14  12/10/18 21:14  12/10/18 21:14














Discharge





- Discharge


Clinical Impression: 


Allergic reaction


Qualifiers:


 Encounter type: initial encounter Qualified Code(s): T78.40XA - Allergy, 

unspecified, initial encounter





Condition: Stable


Disposition: HOME, SELF-CARE


Instructions:  Acute Allergic Reaction ([facility])


Additional Instructions: 


Home tonight and rest.  Medication as prescribed.  Take it off to extended.  

You do not have to take Benadryl if you get the Atarax.  The Atarax is actually 

called hydroxyzine same medication.


Prescriptions: 


Hydroxyzine Pamoate [Vistaril 25 mg Capsule] 25 mg PO DAILY #30 capsule


Prednisone 10 mg PO ASDIR PRN 6 Days #1 tab.[id number].pk


 PRN Reason: 


Ranitidine HCl 150 mg PO BID #30 tablet


Forms:  Return to Work


Referrals: 


[facility] [NO LOCAL MD] - Follow up as needed

## 2019-01-29 NOTE — ER DOCUMENT REPORT
HPI





- HPI


Time Seen by Provider: 01/29/19 11:06


Pain Level: 4


Notes: 





Patient is a 28-year-old female who presents to the ED complaining of nasal 

congestion/discharge, dry nonproductive cough, fever, body ache 1 day.  Patient

states that she is still eating and drinking without difficulties, but does have

a decreased p.o. intake.  She is still urinating normally having normal bowel 

movements.  Patient has been using some over-the-counter meds for symptoms.  She

denies any significant past medical history including cardiopulmonary history 

and immunocompromised conditions.  Patient denies any smoking or IV drug use.  

Patient requesting work note.  Denies any current headache, neck pain, sore 

throat, chest pain, palpitations, syncope, shortness of breath, wheeze, dyspnea,

abdominal pain, nausea/vomiting/diarrhea, urinary retention, dysuria, hematuria,

or rash.





- ROS


Systems Reviewed and Negative: Yes All other systems reviewed and negative





- CONSTITUTIONAL


Constitutional: REPORTS: Fever, Chills





- NEURO


Neurology: REPORTS: Headache





- RESPIRATORY


Respiratory: REPORTS: Coughing





- REPRODUCTIVE


Reproductive: DENIES: Pregnant:





Past Medical History





- Social History


Smoking Status: Former Smoker


Frequency of alcohol use: Social


Drug Abuse: None


Family History: Hyperlipidemia, Hypertension, Malignancy.  denies: Arthritis, C

AD, COPD, CVA, DM, Thyroid Disfunction


Patient has suicidal ideation: No


Patient has homicidal ideation: No





- Past Medical History


Cardiac Medical History: 


   Denies: Hx Coronary Artery Disease, Hx Heart Attack, Hx Hypertension


Pulmonary Medical History: 


   Denies: Hx Asthma, Hx Bronchitis, Hx COPD


Neurological Medical History: Reports: Hx Migraine.  Denies: Hx Cerebrovascular 

Accident, Hx Seizures


Renal/ Medical History: Denies: Hx Peritoneal Dialysis


Musculoskeletal Medical History: Denies Hx Arthritis, Reports Hx Musculoskeletal

Trauma


Skin Medical History: Reports Hx Cellulitis, Reports Hx MRSA


Traumatic Medical History: Reports: Hx Fractures


Past Surgical History: Reports: Hx Breast Surgery - augmentation, Hx Orthopedic 

Surgery - left arm.  Denies: Hx Pacemaker





- Immunizations


Immunizations up to date: Yes


Hx Diphtheria, Pertussis, Tetanus Vaccination: Yes





Vertical Provider Document





- CONSTITUTIONAL


Agree With Documented VS: Yes


Notes: 





PHYSICAL EXAMINATION:





GENERAL: Well-appearing, well-nourished and in no acute distress.  A&Ox4.  

Answers questions appropriately.  Moves comfortably w/o notable distress





HEAD: Atraumatic, normocephalic.





EYES: Pupils equal round and reactive to light, extraocular movements intact, 

sclera anicteric, conjunctiva are normal.





ENT: EAC clear b/l.  TM's intact b/l without erythema, fluid, or perforation.  

Nares patent and with clear discharge.  oropharynx no erythema without exudates.

 No tonsilar hypertrophy without erythema or exudate.  No palatine shift.  Uvula

midline.  No tongue protrusion.  No drooling, hoarseness, or airway compromise. 

Moist mucous membranes.  No sinus tenderness.





NECK: Normal range of motion, supple without lymphadenopathy.  No 

rigidity/meningismus.





LUNGS: Breath sounds clear to auscultation bilaterally and equal.  No wheezes 

rales or rhonchi.  No retractions





HEART: Regular rate and rhythm without murmurs, rubs, gallops.





ABDOMEN: Soft, nontender, nondistended abdomen.  No guarding, no rebound.  No 

masses appreciated.  Normal bowel sounds present.  No CVA tenderness 

bilaterally.  No hepatosplenomegaly.





NEUROLOGICAL: Normal speech, normal gait.  Normal sensory, motor exams 





PSYCH: Normal mood, normal affect.





SKIN: Warm, Dry, normal turgor, no rashes or lesions noted.





- INFECTION CONTROL


TRAVEL OUTSIDE OF THE [place] IN LAST 30 DAYS: No





Course





- Re-evaluation


Re-evalutation: 





01/29/19 11:20


Patient is an afebrile, well-hydrated, 28-year-old female who presents to the ED

with acute URI, suspect influenza.  Vitals are acceptable.  PE is otherwise 

unremarkable.  No labs or imaging warranted at this time based on H&P.  Patient 

has no significant cardiopulmonary or immunocompromised medical conditions.  

Patient's lungs are clear to auscultation bilaterally without tachycardia, 

hypoxia, or tachypnea.  Patient is tolerating p.o. without any difficulties.  

Thoroughly reviewed the risks, benefits, potential side effects, estimated cost 

without insurance with patient.  After thorough review, patient declined Tamiflu

at this time.  Low suspicion for any meningitis, sepsis, 

peritonsillar/pharyngeal abscess, respiratory compromise, severe dehydration, or

other emergent systemic condition at this time.  Patient is aware this condition

can change from initial presentation and she needs to monitor symptoms closely. 

Conservative measures otherwise for symptoms.  Recheck with your PCM in 3-5 

days.  Return to the ED with any worsening/concerning symptoms otherwise as 

reviewed in discharge.  Patient is in agreement.





- Vital Signs


Vital signs: 


                                        











Temp Pulse Resp BP Pulse Ox


 


 99.8 F   93   15   128/82 H  100 


 


 01/29/19 10:48  01/29/19 10:48  01/29/19 10:48  01/29/19 10:48  01/29/19 10:48














Discharge





- Discharge


Clinical Impression: 


 Acute URI





Condition: Stable


Disposition: HOME, SELF-CARE


Instructions:  Upper Respiratory Illness ([facility])


Additional Instructions: 


Maintain adequate fluid intake


Take meds as directed


tylenol/ibuprofen as needed


over the counter cold medication as needed for symptoms


Humidified air may help


Wash your hands regularly


Wear a mask when coughing


F/u:  with your PCM in 3-5 days for a recheck





Return to the ED with any fever, worsening pain, chest pain, palpitations, 

syncope, worsening HA, neck pain/stiffness, shortness of breath, wheezing, 

drooling, trouble swallowing/breathing, abdominal pain, n/v/d, rash, or 

worsening/concerning symptoms otherwise.


Forms:  Elevated Blood Pressure


Referrals: 


[facility] [Provider Group] - Follow up as needed


[facility] [Provider Group] - Follow up as needed

## 2019-01-31 NOTE — ER DOCUMENT REPORT
HPI





- HPI


Pain Level: 2


Notes: 





Patient is a 28-year-old female whom I saw a couple days ago and diagnosed with 

the flu presents the emergency department requesting a work note for a couple 

more days as her work wants her to come back today, but she still continues to 

cough and works around food.  Patient states that she is feeling much better 

than she was couple days ago.  She is eating and drinking without difficulty.  

She is urinating normally.  No other concerns or complaints.  Denies any 

headache, fever, neck pain, sore throat, chest pain, palpitations, syncope, 

shortness of breath, wheeze, dyspnea, abdominal pain, nausea/vomiting/diarrhea, 

urinary retention, dysuria, hematuria, or rash.





- ROS


Systems Reviewed and Negative: Yes All other systems reviewed and negative





- REPRODUCTIVE


Reproductive: DENIES: Pregnant:





Past Medical History





- Social History


Smoking Status: Former Smoker


Family History: Hyperlipidemia, Hypertension, Malignancy.  denies: Arthritis, 

CAD, COPD, CVA, DM, Thyroid Disfunction





- Past Medical History


Cardiac Medical History: 


   Denies: Hx Coronary Artery Disease, Hx Heart Attack, Hx Hypertension


Pulmonary Medical History: 


   Denies: Hx Asthma, Hx Bronchitis, Hx COPD


Neurological Medical History: Reports: Hx Migraine.  Denies: Hx Cerebrovascular 

Accident, Hx Seizures


Renal/ Medical History: Denies: Hx Peritoneal Dialysis


Musculoskeletal Medical History: Denies Hx Arthritis, Reports Hx Musculoskeletal

Trauma


Skin Medical History: Reports Hx Cellulitis, Reports Hx MRSA


Traumatic Medical History: Reports: Hx Fractures


Past Surgical History: Reports: Hx Breast Surgery - augmentation, Hx Orthopedic 

Surgery - left arm.  Denies: Hx Pacemaker





- Immunizations


Immunizations up to date: Yes


Hx Diphtheria, Pertussis, Tetanus Vaccination: Yes





Vertical Provider Document





- CONSTITUTIONAL


Agree With Documented VS: Yes


Notes: 





PHYSICAL EXAMINATION:





GENERAL: Well-appearing, well-nourished and in no acute distress.  A&Ox4.  

Answers questions appropriately.  Moves comfortably w/o notable distress





HEAD: Atraumatic, normocephalic.





EYES: Pupils equal round and reactive to light, extraocular movements intact, 

sclera anicteric, conjunctiva are normal.





ENT: Nares patent and with clear discharge.  oropharynx no erythema without 

exudates.  No tonsilar hypertrophy without erythema or exudate.  No palatine 

shift.  Uvula midline.  No tongue protrusion.  No drooling, hoarseness, or 

airway compromise.  Moist mucous membranes.  No sinus tenderness.





NECK: Normal range of motion, supple without lymphadenopathy.  No rigi

dity/meningismus.





LUNGS: Breath sounds clear to auscultation bilaterally and equal.  No wheezes 

rales or rhonchi.  No retractions





HEART: Regular rate and rhythm without murmurs, rubs, gallops.





ABDOMEN: Soft, nontender, nondistended abdomen.  No guarding, no rebound.  No 

masses appreciated.  Normal bowel sounds present.  No CVA tenderness 

bilaterally.  No hepatosplenomegaly.





NEUROLOGICAL: Normal speech, normal gait.  Normal sensory, motor exams 





PSYCH: Normal mood, normal affect.





SKIN: Warm, Dry, normal turgor, no rashes or lesions noted.





- INFECTION CONTROL


TRAVEL OUTSIDE OF THE [place] IN LAST 30 DAYS: No





Course





- Re-evaluation


Re-evalutation: 





01/31/19 09:23


Patient is an afebrile, well-hydrated, 28-year-old female who presents emergency

department primarily for a work note for her continued upper respiratory 

infection which I suspect to be influenza.  Vitals are acceptable without 

significant tachycardia, tachypnea, or hypoxia.  PE is otherwise unremarkable.  

Patient is nontoxic-appearing and is swallowing p.o. without difficulty.  She 

has had improvement in her symptoms over the last few days.  I will give her a 

work note.  Recheck with PCM in 2-3 days.  Return to the ED with any other 

worsening/concerning symptoms as reviewed.  Low suspicion for any serious or 

life-threatening condition at this time.  Patient is in agreement.





- Vital Signs


Vital signs: 


                                        











Temp Pulse Resp BP Pulse Ox


 


 98.6 F   89   14   125/82   98 


 


 01/31/19 09:19  01/31/19 09:19  01/31/19 09:19  01/31/19 09:19  01/31/19 09:19














Discharge





- Discharge


Clinical Impression: 


 Acute URI





Condition: Stable


Disposition: HOME, SELF-CARE


Additional Instructions: 


Maintain adequate fluid intake


Take meds as directed


tylenol/ibuprofen as needed


over the counter cold medication as needed for symptoms


Humidified air may help


Wash your hands regularly


Wear a mask when coughing


F/u:  with your PCM in 3-5 days for a recheck





Return to the ED with any fever, worsening pain, chest pain, palpitations, 

syncope, worsening HA, neck pain/stiffness, shortness of breath, wheezing, 

drooling, trouble swallowing/breathing, abdominal pain, n/v/d, rash, or 

worsening/concerning symptoms otherwise.


Forms:  Return to Work


Referrals: 


[facility] [Provider Group] - Follow up as needed

## 2019-12-10 NOTE — ER DOCUMENT REPORT
HPI





- HPI


Time Seen by Provider: 12/10/19 11:10


Notes: 





29-year-old female presents to the ED for cough, cold-like symptoms for the last

4 days, states she is feeling slightly better.  Patient reports she does need a 

work note.  Vaccinations are up-to-date.  Over-the-counter medications have been

trying.  Denies fevers, chills,  chest pain,palpitations,  shortness of breath, 

dyspnea, nausea, vomiting, diarrhea, abdominal pain, hematuria,blurred vision, 

double vision, loss of vision, speech changes, LH, dizziness, syncope, 

headaches, wheezing, ST,  neck pain, weakness, bowel or bladder dysfunction, 

saddle anesthesia, numbness or tingling in bilateral upper or lower extremities 

equally, muscle paralysis, weakness in bilateral upper or lower extremities 

equally or rash. 





- REPRODUCTIVE


Reproductive: DENIES: Pregnant:





Past Medical History





- General


Information source: Patient





- Social History


Smoking Status: Never Smoker


Family History: Hyperlipidemia, Hypertension, Malignancy.  denies: Arthritis, 

CAD, COPD, CVA, DM, Thyroid Disfunction





- Past Medical History


Cardiac Medical History: 


   Denies: Hx Coronary Artery Disease, Hx Heart Attack, Hx Hypertension


Pulmonary Medical History: 


   Denies: Hx Asthma, Hx Bronchitis, Hx COPD


Neurological Medical History: Reports: Hx Migraine.  Denies: Hx Cerebrovascular 

Accident, Hx Seizures


Renal/ Medical History: Denies: Hx Peritoneal Dialysis


Musculoskeletal Medical History: Denies Hx Arthritis, Reports Hx Musculoskeletal

Trauma


Skin Medical History: Reports Hx Cellulitis, Reports Hx MRSA


Traumatic Medical History: Reports: Hx Fractures


Past Surgical History: Reports: Hx Breast Surgery - augmentation, Hx Orthopedic 

Surgery - left arm.  Denies: Hx Pacemaker





- Immunizations


Immunizations up to date: Yes


Hx Diphtheria, Pertussis, Tetanus Vaccination: Yes





Vertical Provider Document





- CONSTITUTIONAL


Agree With Documented VS: Yes


Exam Limitations: No Limitations


General Appearance: WD/WN


Notes: 





REVIEW OF SYSTEMS:reviewed vital signs by RN


CONSTITUTIONAL :  Denies fever,  chills, or sweats.  Denies recent illness.


EENT:   Denies eye, ear, throat, or mouth pain or symptoms.  Denies nasal or 

sinus congestion or discharge.  Denies throat, tongue, or mouth swelling or 

difficulty swallowing.


CARDIOVASCULAR:  Denies chest pain.  Denies palpitations or racing or irregular 

heart beat.  Denies ankle edema.


RESPIRATORY:  Denies cough, cold, or chest congestion.  Denies shortness of 

breath, difficulty breathing, or wheezing.


GASTROINTESTINAL:  Denies abdominal pain or distention.  Denies nausea, 

vomiting, or diarrhea.  Denies blood in vomitus, stools, or per rectum.  Denies 

black, tarry stools.  Denies constipation. 


GENITOURINARY:  Denies difficulty urinating, painful urination, burning, 

frequency, blood in urine, or discharge.


FEMALE  GENITOURINARY:  Denies vaginal bleeding, heavy or abnormal periods, 

irregular periods.  Denies vaginal discharge or odor. 


MUSCULOSKELETAL:  Denies back or neck pain or stiffness.  Denies joint pain or 

swelling.


SKIN:   Denies rash, lesions or sores.


HEMATOLOGIC :   Denies easy bruising or bleeding.


LYMPHATIC:  Denies swollen, enlarged glands.


NEUROLOGICAL:  Denies confusion or altered mental status.  Denies passing out or

loss of consciousness.  Denies dizziness or lightheadedness.  Denies headache.  

Denies weakness or paralysis or loss of use of either side.  Denies problems 

with gait or speech.  Denies sensory loss, numbness, or tingling.  Denies 

seizures.


PSYCHIATRIC:  Denies anxiety or stress.  Denies depression, suicidal ideation, 

or homicidal ideation.





ALL OTHER SYSTEMS REVIEWED AND NEGATIVE.











PHYSICAL EXAMINATION:





GENERAL: Well-appearing, well-nourished and in no acute distress.





HEAD: Atraumatic, normocephalic.





EYES: Pupils equal round and reactive to light, extraocular movements intact, 

conjunctiva are normal.





ENT: Nares patent, oropharynx clear without exudates.  Moist mucous membranes.





NECK: Normal range of motion, supple without lymphadenopathy





LUNGS: Breath sounds clear to auscultation bilaterally and equal.  No wheezes 

rales or rhonchi.





HEART: Regular rate and rhythm without murmurs





ABDOMEN: Soft, nontender, nondistended abdomen.  No guarding, no rebound.  No 

masses appreciated.





Female : deferred





Musculoskeletal: Normal range of motion, no pitting or edema.  No cyanosis.





NEUROLOGICAL: Cranial nerves grossly intact.  Normal speech, normal gait.  

Normal sensory, motor exams





PSYCH: Normal mood, normal affect.





SKIN: Warm, Dry, normal turgor, no rashes or lesions noted.

















Dictation was performed using Dragon voice recognition software 





- INFECTION CONTROL


TRAVEL OUTSIDE OF THE [place] IN LAST 30 DAYS: No





Course





- Re-evaluation


Re-evalutation: 





12/10/19 11:32


Presentation is most consistent with a viral upper respiratory infection.  Maria Fernanda

ent is overall well appearance, vitals within normal limits, well-hydrated.  

Patient denies any headache, neck pain, and has no evidence of meningismus on 

examination.  Lungs are clear bilaterally.  No evidence of respiratory distress.

 Based on clinical exam and history, I do not suspect an acute pneumonia, 

meningitis, strep pharyngitis, or an acute encephalitis.  No laboratory or 

imaging testing is indicated at this time.  Will discharge patient with return 

precautions and followup recommendations.  They are in agreement this plan have 

verbalized understanding return precautions.





- Vital Signs


Vital signs: 


                                        











Temp Pulse Resp BP Pulse Ox


 


 98.2 F   70   16   115/81   100 


 


 12/10/19 11:08  12/10/19 11:08  12/10/19 11:08  12/10/19 11:08  12/10/19 11:08














Discharge





- Discharge


Clinical Impression: 


 URI (upper respiratory infection)





Condition: Stable


Disposition: HOME, SELF-CARE


Instructions:  Upper Respiratory Illness ([facility]), Viral Syndrome ([facility])


Additional Instructions: 


You were seen here today for cold-like symptoms.  Work note was provided.  

Increase hydration, wash hands frequently, over-the-counter cough and cold 

medications.  You ask inhaler as needed.  Follow-up with primary care provider 

if symptoms become worse return to the emergency room if you experience any 

shortness of breath chest pain, high fevers chills nausea vomiting





Return immediately for any new or worsening symptoms.





Follow up with primary care provider, call tomorrow to make followup 

appointment.


Prescriptions: 


Albuterol Sulfate [Proair Respiclick] 90 mcg IH Q4HP PRN #1 [email or web address]


 PRN Reason: 


Forms:  Return to Work


Referrals: 


FELIX ROTH MD [ACTIVE STAFF] - Follow up as needed

## 2020-02-29 NOTE — ER DOCUMENT REPORT
HPI





- HPI


Time Seen by Provider: 02/29/20 12:05


Notes: 





Patient is a 29-year-old female with no significant past medical history aside 

from having chickenpox as a child who presents complaining of a painful rash 

near her bra line on the left side of her back that is been present for 2 days. 

Patient states that she does have some muscle soreness associated to that area 

as well that precipitated the rash.  No new exposure chemicals, detergents, 

soaps, clothing, foods, or travel.  She has not noticed any purulent discharge. 

Denies any headache, fever, neck pain, URI, sore throat, chest pain, 

palpitations, syncope, cough, shortness of breath, wheeze, dyspnea, abdominal 

pain, nausea/vomiting/diarrhea, urinary retention, dysuria, hematuria, loss of 

control of bowel or bladder, numbness/tingling, saddle anesthesia, muscle 

paralysis/weakness.





- ROS


Systems Reviewed and Negative: Yes All other systems reviewed and negative





- REPRODUCTIVE


Reproductive: DENIES: Pregnant:





Past Medical History





- Social History


Smoking Status: Unknown if Ever Smoked


Family History: Hyperlipidemia, Hypertension, Malignancy.  denies: Arthritis, 

CAD, COPD, CVA, DM, Thyroid Disfunction





- Past Medical History


Cardiac Medical History: 


   Denies: Hx Coronary Artery Disease, Hx Heart Attack, Hx Hypertension


Pulmonary Medical History: 


   Denies: Hx Asthma, Hx Bronchitis, Hx COPD


Neurological Medical History: Reports: Hx Migraine.  Denies: Hx Cerebrovascular 

Accident, Hx Seizures


Renal/ Medical History: Denies: Hx Peritoneal Dialysis


Musculoskeletal Medical History: Denies Hx Arthritis, Reports Hx Musculoskeletal

Trauma


Skin Medical History: Reports Hx Cellulitis, Reports Hx MRSA


Traumatic Medical History: Reports: Hx Fractures


Past Surgical History: Reports: Hx Breast Surgery - augmentation, Hx Orthopedic 

Surgery - left arm.  Denies: Hx Pacemaker





- Immunizations


Immunizations up to date: Yes


Hx Diphtheria, Pertussis, Tetanus Vaccination: Yes





Vertical Provider Document





- CONSTITUTIONAL


Agree With Documented VS: Yes


Notes: 





PHYSICAL EXAMINATION:





GENERAL: Well-appearing, well-nourished and in no acute distress.





HEAD: Atraumatic, normocephalic.





EYES: Pupils equal round and reactive to light, extraocular movements intact, 

sclera anicteric, conjunctiva are normal.





ENT: Nares patent and without discharge.  oropharynx clear without exudates.  No

tonsilar hypertrophy or erythema.  Moist mucous membranes.  No sinus tenderness.





NECK: Normal range of motion, supple without lymphadenopathy





LUNGS: Breath sounds clear to auscultation bilaterally and equal.  No wheezes 

rales or rhonchi.





HEART: Regular rate and rhythm without murmurs, rubs, gallops.





Musculoskeletal: FROM to passive/active. Strength 5+/5. 





Extremities:  No cyanosis, clubbing, or edema b/l.  Peripheral pulses 2+.  

Capillary refill less than 3 seconds.





NEUROLOGICAL:  Normal speech, normal gait. 





PSYCH: Normal mood, normal affect.





SKIN: There is an erythemic macular papular vesicular rash in a dermatomal 

pattern to the left mid back approx dermatome T8 +/-.  Unilateral and does not 

cross midline.





- INFECTION CONTROL


TRAVEL OUTSIDE OF THE [place] IN LAST 30 DAYS: No





Course





- Re-evaluation


Re-evalutation: 





02/29/20 12:12


Patient is an afebrile, well-hydrated, 29-year-old female who presents with a 

rash most consistent with shingles.  Vitals are acceptable without significant 

tachycardia, tachypnea, hypoxia.  PE is otherwise unremarkable.  No labs or 

imaging warranted at this time.  Precautions reviewed.  Low suspicion for any 

necrotizing fasciitis, SJS, SSS, drug reaction, sepsis, meningitis, syphilis, 

Lyme disease, [place] spotted fever, or other systemic emergent condition at

this time.  Patient aware that condition can change from initial presentation 

and she needs to monitor symptoms closely and seek medical attention with any 

acute changes.  Rx for valtrex.  Recheck with your PCM in 3-5 days.  Consider 

consult with dermatology.  Return to the ED with any other worsening/concerning 

symptoms as reviewed.  Patient is in agreement.





- Vital Signs


Vital signs: 


                                        











Temp Pulse Resp BP Pulse Ox


 


 98.7 F   80   16   126/75 H  100 


 


 02/29/20 11:45  02/29/20 11:45  02/29/20 11:45  02/29/20 11:45  02/29/20 11:45














Discharge





- Discharge


Clinical Impression: 


Shingles


Qualifiers:


 Herpes zoster complications: without complications Qualified Code(s): B02.9 - 

Zoster without complications





Condition: Stable


Disposition: HOME, SELF-CARE


Instructions:  Shingles ([facility])


Additional Instructions: 


Keep the skin clean


Wash with soap and water


Tylenol/ibuprofen if needed


Triple antibiotic ointment daily for any break in the skin


Take medication as directed


Monitor for any worsening symptoms


Recheck with your PCM in 3-5 days


Consider consult with dermatology


Evaluate from pregnant women, really young, and really old


Return to the ED with any worsening symptoms and/or development of fever, 

headache, chest pain, palpitations, syncope, shortness of breath, trouble 

breathing, abdominal pain, n/v/d, abscess, purulent discharge, red streaks, 

worsening swelling, or other worsening symptoms that are concerning to you.


Prescriptions: 


Valacyclovir HCl [Valtrex 500 mg Tablet] 1,000 mg PO TID 7 Days #21 tablet


Forms:  Elevated Blood Pressure


Referrals: 


PETE GUTIÉRREZ DO [ACTIVE STAFF] - Follow up as needed

## 2020-03-02 NOTE — ER DOCUMENT REPORT
ED General





- General


Chief Complaint: Diarrhea


Stated Complaint: DIAHERRA, STOMACH/BACK PAIN


Time Seen by Provider: 03/02/20 16:37


Mode of Arrival: Ambulatory


Information source: Patient


TRAVEL OUTSIDE OF THE [place] IN LAST 30 DAYS: No





- HPI


Notes: 





Patient presents with complaints of diarrhea and bilateral lower abdominal 

crampy pain.  This is been present for 2 days.  She states yesterday she was 

diagnosed with shingles but not started any medicines for this yet.  No fevers 

or rashes.  No urinary or vaginal symptoms.  No blood in her stool.  The pain 

has been intermittent.  Nothing makes it better or worse.  It radiates across 

lower abdomen.  It is moderate in intensity.  Crampy in nature.





- Related Data


Allergies/Adverse Reactions: 


                                        





Sulfa (Sulfonamide Antibiotics) Allergy (Verified 03/02/20 16:20)


   


sulfamethoxazole [From Bactrim] Allergy (Verified 03/02/20 16:20)


   


trimethoprim [From Bactrim] Allergy (Verified 03/02/20 16:20)


   











Past Medical History





- General


Information source: Patient





- Social History


Smoking Status: Never Smoker


Frequency of alcohol use: None


Drug Abuse: None


Family History: Hyperlipidemia, Hypertension, Malignancy.  denies: Arthritis, 

CAD, COPD, CVA, DM, Thyroid Disfunction


Patient has suicidal ideation: No


Patient has homicidal ideation: No





- Past Medical History


Cardiac Medical History: 


   Denies: Hx Coronary Artery Disease, Hx Heart Attack, Hx Hypertension


Pulmonary Medical History: 


   Denies: Hx Asthma, Hx Bronchitis, Hx COPD


Neurological Medical History: Reports: Hx Migraine.  Denies: Hx Cerebrovascular 

Accident, Hx Seizures


Renal/ Medical History: Denies: Hx Peritoneal Dialysis


Musculoskeletal Medical History: Denies Hx Arthritis, Reports Hx Musculoskeletal

Trauma


Skin Medical History: Reports Hx Cellulitis, Reports Hx MRSA


Traumatic Medical History: Reports: Hx Fractures


Past Surgical History: Reports: Hx Breast Surgery - augmentation, Hx Orthopedic 

Surgery - left arm.  Denies: Hx Pacemaker





- Immunizations


Immunizations up to date: Yes


Hx Diphtheria, Pertussis, Tetanus Vaccination: Yes





Review of Systems





- Review of Systems


Constitutional: Malaise, Weakness


Cardiovascular: denies: Chest pain, Palpitations


Respiratory: denies: Cough, Hemoptysis, Short of breath


-: Yes All other systems reviewed and negative





Physical Exam





- Vital signs


Vitals: 





                                        











Temp Pulse Resp BP Pulse Ox


 


 99.0 F   107 H  20   142/82 H  98 


 


 03/02/20 15:43  03/02/20 15:43  03/02/20 15:43  03/02/20 15:43  03/02/20 15:43











Interpretation: Tachycardic





- General


General appearance: Appears well, Alert





- HEENT


Head: Normocephalic, Atraumatic


Eyes: Normal


Pupils: PERRL





- Respiratory


Respiratory status: No respiratory distress


Chest status: Nontender


Breath sounds: Normal


Chest palpation: Normal





- Cardiovascular


Rhythm: Tachycardia


Heart sounds: Normal auscultation


Murmur: No





- Abdominal


Inspection: Normal


Distension: No distension


Bowel sounds: Normal


Tenderness: Tender - Mild bilateral lower quadrants


Organomegaly: No organomegaly





- Back


Back: Normal, Nontender





- Extremities


General upper extremity: Normal inspection, Nontender, Normal color, Normal ROM,

Normal temperature


General lower extremity: Normal inspection, Nontender, Normal color, Normal ROM,

Normal temperature, Normal weight bearing.  No: David's sign





- Neurological


Neuro grossly intact: Yes


Cognition: Normal


Orientation: AAOx4


[place]aren Coma Scale Eye Opening: Spontaneous


Daren Coma Scale Verbal: Oriented


Daren Coma Scale Motor: Obeys Commands


[place] Coma Scale Total: 15


Speech: Normal


Motor strength normal: LUE, RUE, LLE, RLE


Sensory: Normal





- Psychological


Associated symptoms: Normal affect, Normal mood





- Skin


Skin Temperature: Warm


Skin Moisture: Dry


Skin Color: Normal





Course





- Re-evaluation


Re-evalutation: 





03/02/20 19:58


Patient presents with diarrhea.  CT scan is unremarkable.  No risk factors for 

C. difficile.  No fever.  Patient's vital signs are stable.  She appears to have

contracted a viral syndrome.  I will advise the patient about over-the-counter 

medications and have her follow-up with her primary care physician.





- Vital Signs


Vital signs: 





                                        











Temp Pulse Resp BP Pulse Ox


 


 99.0 F   107 H  20   142/82 H  98 


 


 03/02/20 15:43  03/02/20 15:43  03/02/20 15:43  03/02/20 15:43  03/02/20 15:43














- Laboratory


Result Diagrams: 


                                 03/02/20 17:25





                                 03/02/20 17:25


Laboratory results interpreted by me: 





                                        











  03/02/20 03/02/20 03/02/20





  17:25 17:25 18:45


 


RDW  14.1 H  


 


Lymph % (Auto)  7.7 L  


 


Seg Neutrophils %  87.9 H  


 


Total Protein   9.1 H 


 


Urine Ketones    20 H


 


Urine Blood    SMALL H














- Diagnostic Test


Radiology reviewed: Image reviewed, Reports reviewed





Discharge





- Discharge


Clinical Impression: 


Diarrhea


Qualifiers:


 Diarrhea type: unspecified type Qualified Code(s): R19.7 - Diarrhea, 

unspecified





Condition: Stable


Disposition: HOME, SELF-CARE


Instructions:  Diarrhea, Nonspecific ([facility])


Additional Instructions: 


Please follow-up with your primary care physician as soon as possible


Prescriptions: 


Hydrocodone/Acetaminophen [Norco 5-325 mg Tablet] 1 tab PO Q6 PRN 3 Days #12 t

ablet


 PRN Reason: 


Ondansetron [Zofran Odt 4 mg Tablet] 1 - 2 tab PO Q4H PRN #15 tab.rapdis


 PRN Reason: For Nausea/Vomiting


Forms:  Return to Work

## 2020-03-02 NOTE — RADIOLOGY REPORT (SQ)
EXAM DESCRIPTION:  CT ABD/PELVIS NO ORAL OR IV



COMPLETED DATE/TIME:  3/2/2020 7:21 pm



REASON FOR STUDY:  llq pain



COMPARISON:  None.



TECHNIQUE:  CT scan of the abdomen and pelvis performed without intravenous or oral contrast. Images 
reviewed with lung, soft tissue, and bone windows. Reconstructed coronal and sagittal MPR images revi
ewed. All images stored on PACS.

All CT scanners at this facility use dose modulation, iterative reconstruction, and/or weight based d
osing when appropriate to reduce radiation dose to as low as reasonably achievable (ALARA).

[facility]: Dose Right  [facility]: CareDose    [facility]: Dose Right    [facility]: Teradose 4D    [facility]: Smart Algorego



RADIATION DOSE:  CT Rad equipment meets quality standard of care and radiation dose reduction techniq
ues were employed. CTDIvol: 14.2 mGy. DLP: 796 mGy-cm.mGy.



LIMITATIONS:  None.



FINDINGS:  LOWER CHEST: No significant findings. No nodules or infiltrates.

NON-CONTRASTED LIVER, SPLEEN, ADRENALS: Evaluation limited by lack of IV contrast. No identified sign
ificant masses.

PANCREAS: No masses. No peripancreatic inflammatory changes.

GALLBLADDER: No identified stones by CT criteria. No inflammatory changes to suggest cholecystitis.

RIGHT KIDNEY AND URETER: No suspicious masses. Assessment limited by lack of IV contrast.   No signif
icant calcifications.   No hydronephrosis or hydroureter.

LEFT KIDNEY AND URETER: No suspicious masses. Assessment limited by lack of IV contrast.   No signifi
cant calcifications.   No hydronephrosis or hydroureter.

AORTA AND RETROPERITONEUM: No aneurysm. No retroperitoneal masses or adenopathy.

BOWEL AND PERITONEAL CAVITY: No obvious masses or inflammatory changes. No free fluid.

APPENDIX: Not identified.

PELVIS, BLADDER, AND ABDOMINAL WALL:No abnormal masses. No free fluid. Bladder normal.

BONES: No significant findings.

OTHER: No other significant finding.



IMPRESSION:  NO SIGNIFICANT OR ACUTE PROCESS IN THE ABDOMEN OR PELVIS.



COMMENT:  Quality ID # [id number]: Final reports with documentation of one or more dose reduction techniques
 (e.g., Automated exposure control, adjustment of the mA and/or kV according to patient size, use of 
iterative reconstruction technique)



TECHNICAL DOCUMENTATION:  JOB ID:  [id number]

 2011 [employer]- All Rights Reserved



Reading location - IP/workstation name: [email or web address]

## 2020-03-13 NOTE — ER DOCUMENT REPORT
HPI





- HPI


Time Seen by Provider: 03/13/20 12:22


Notes: 





Patient is an otherwise healthy 29-year-old female presenting to the emergency 

department with request for a work note.  Patient reports she recently had 

shingles and they will not allow her to return to work until she has a return to

work note.  Patient reports she feels fine, has not had any additional symptoms 

and wishes to return to work.








- REPRODUCTIVE


Reproductive: DENIES: Pregnant:





Past Medical History





- General


Information source: Patient





- Social History


Smoking Status: Never Smoker


Frequency of alcohol use: None


Family History: Hyperlipidemia, Hypertension, Malignancy.  denies: Arthritis, 

CAD, COPD, CVA, DM, Thyroid Disfunction





- Past Medical History


Cardiac Medical History: 


   Denies: Hx Coronary Artery Disease, Hx Heart Attack, Hx Hypertension


Pulmonary Medical History: 


   Denies: Hx Asthma, Hx Bronchitis, Hx COPD


Neurological Medical History: Reports: Hx Migraine.  Denies: Hx Cerebrovascular 

Accident, Hx Seizures


Renal/ Medical History: Denies: Hx Peritoneal Dialysis


Musculoskeletal Medical History: Denies Hx Arthritis, Reports Hx Musculoskeletal

Trauma


Skin Medical History: Reports Hx Cellulitis, Reports Hx MRSA


Traumatic Medical History: Reports: Hx Fractures


Past Surgical History: Reports: Hx Breast Surgery - augmentation, Hx Orthopedic 

Surgery - left arm.  Denies: Hx Pacemaker





- Immunizations


Immunizations up to date: Yes


Hx Diphtheria, Pertussis, Tetanus Vaccination: Yes





Vertical Provider Document





- CONSTITUTIONAL


Notes: 





PHYSICAL EXAMINATION:





GENERAL: Well-appearing, well-nourished and in no acute distress.





HEAD: Atraumatic, normocephalic.





EYES: Pupils equal round extraocular movements intact,  conjunctiva are normal.





ENT: Nares patent





NECK: Normal range of motion





LUNGS: No respiratory distress





Musculoskeletal: Normal range of motion





NEUROLOGICAL:  Normal speech, normal gait. 





PSYCH: Normal mood, normal affect.





SKIN: Warm, Dry, normal turgor, no rashes or lesions noted.





- INFECTION CONTROL


TRAVEL OUTSIDE OF THE [place] IN LAST 30 DAYS: No





Course





- Re-evaluation


Re-evalutation: 





Physical exam unremarkable.  Patient will be provided with note for work return.








- Vital Signs


Vital signs: 


                                        











Temp Pulse Resp BP Pulse Ox


 


 98.6 F   76   18   147/90 H  99 


 


 03/13/20 12:19  03/13/20 12:19  03/13/20 12:19  03/13/20 12:19  03/13/20 12:19














Discharge





- Discharge


Clinical Impression: 


Back pain


Qualifiers:


 Back pain location: low back pain Chronicity: unspecified Back pain laterality:

unspecified Sciatica presence: unspecified whether sciatica present Qualified 

Code(s): M54.5 - Low back pain





Condition: Stable


Disposition: HOME, SELF-CARE


Additional Instructions: 


You may return to work today.  Please return to the emergency department for any

new or worsening complaints.





Forms:  Return to Work

## 2020-06-25 NOTE — ER DOCUMENT REPORT
HPI





- HPI


Patient complains to provider of: back pan 


Time Seen by Provider: 06/25/20 09:51


Onset: Yesterday


Onset/Duration: Waxing and waning


Quality of pain: No pain


Pain Level: 4


Associated Symptoms: None


Exacerbated by: Denies


Relieved by: Denies


Notes: 





This is a 30-year-old female presented to the emergency room today stating that 

she has spasm under the right scapula which is been ongoing for approximately 18

hours.  No chest pain no shortness of breath no exertional chest pain no ex

ertional shortness of breath no cough fever congestion.





- REPRODUCTIVE


Reproductive: DENIES: Pregnant:





Past Medical History





- General


Information source: Patient





- Social History


Smoking Status: Current Some Day Smoker


Frequency of alcohol use: None


Drug Abuse: None


Family History: Hyperlipidemia, Hypertension, Malignancy.  denies: Arthritis, 

CAD, COPD, CVA, DM, Thyroid Disfunction


Patient has homicidal ideation: No





- Past Medical History


Cardiac Medical History: 


   Denies: Hx Coronary Artery Disease, Hx Heart Attack, Hx Hypertension


Pulmonary Medical History: 


   Denies: Hx Asthma, Hx Bronchitis, Hx COPD


Neurological Medical History: Reports: Hx Migraine.  Denies: Hx Cerebrovascular 

Accident, Hx Seizures


Renal/ Medical History: Denies: Hx Peritoneal Dialysis


Musculoskeletal Medical History: Denies Hx Arthritis, Reports Hx Musculoskeletal

Trauma


Skin Medical History: Reports Hx Cellulitis, Reports Hx MRSA


Traumatic Medical History: Reports: Hx Fractures


Past Surgical History: Reports: Hx Breast Surgery - augmentation, Hx Orthopedic 

Surgery - left arm.  Denies: Hx Pacemaker





- Immunizations


Immunizations up to date: Yes


Hx Diphtheria, Pertussis, Tetanus Vaccination: Yes





Vertical Provider Document





- CONSTITUTIONAL


Agree With Documented VS: Yes





- INFECTION CONTROL


TRAVEL OUTSIDE OF THE [place] IN LAST 30 DAYS: No





- HEENT


HEENT: Atraumatic, Conjuctival Injection, Normocephalic, PERRLA





- NECK


Neck: Normal Inspection, Supple





- RESPIRATORY


Respiratory: Breath Sounds Normal, No Respiratory Distress





- CARDIOVASCULAR


Cardiovascular: Regular Rate, Regular Rhythm





- GI/ABDOMEN


Gastrointestinal: Abdomen Soft, Abdomen Non-Tender





- REPRODUCTIVE


Female Genitalia: Normal Inspection





- MUSCULOSKELETAL/EXTREMETIES


Musculoskeletal/Extremeties: MAEW - No midline tenderness no step-off no 

crepitus patient does have palpable spasm subscapular Rene on the right 

posterior.





Course





- Vital Signs


Vital signs: 


                                        











Temp Pulse Resp BP Pulse Ox


 


 98.9 F   74   14   137/87 H  100 


 


 06/25/20 09:24  06/25/20 09:07  06/25/20 09:07  06/25/20 09:07  06/25/20 09:07














Discharge





- Discharge


Clinical Impression: 


 Muscle spasm





Condition: Good


Disposition: HOME, SELF-CARE


Instructions:  Warm Packs ([facility]), Muscle Strain ([facility])


Additional Instructions: 


Heat to affected area 4-5 times a day.


Light stretching exercises.


Must follow-up PMD in 2 to 3 days and follow-up until complete resolution.


Increase fluid intake rest.


Return to the emergency room for any change worsening condition.


Prescriptions: 


Methocarbamol [Robaxin 750 mg Tablet] 750 mg PO ASDIR PRN #40 tablet


 PRN Reason:

## 2020-07-16 NOTE — ER DOCUMENT REPORT
ED Medical Screen (RME)





- General


Chief Complaint: Knee Pain


Stated Complaint: KNEE PAIN


Time Seen by Provider: 07/16/20 17:43


Mode of Arrival: Wheelchair


Information source: Patient


Notes: 





30-year-old female presented to ED for complaint of right knee pain.  She is not

able to lift her foot off the chair except for by using her thigh.  She states 

she had a torn ligaments and meniscus a couple years ago but had not been able 

to see someone since she did not have a doctor.  She states she did go to 

orthopedic many years ago but not recently.  She states today she was lifting a 

box and turning when she felt a pop pop pop and then she cannot stand or walk on

the knee.  She states she is not able to bear weight at all on that foot.

















I have greeted and performed a rapid initial assessment of this patient.  A 

comprehensive ED assessment and evaluation of the patient, analysis of test 

results and completion of medical decision making process will be conducted by 

an additional ED providers.


TRAVEL OUTSIDE OF THE [place] IN LAST 30 DAYS: No





- Related Data


Allergies/Adverse Reactions: 


                                        





Sulfa (Sulfonamide Antibiotics) Allergy (Verified 07/16/20 17:34)


   


sulfamethoxazole [From Bactrim] Allergy (Verified 07/16/20 17:34)


   


trimethoprim [From Bactrim] Allergy (Verified 07/16/20 17:34)


   











Past Medical History





- Social History


Chew tobacco use (# tins/day): No


Frequency of alcohol use: Occasional


Drug Abuse: None





- Past Medical History


Cardiac Medical History: 


   Denies: Hx Coronary Artery Disease, Hx Heart Attack, Hx Hypertension


Pulmonary Medical History: 


   Denies: Hx Asthma, Hx Bronchitis, Hx COPD


Neurological Medical History: Reports: Hx Migraine.  Denies: Hx Cerebrovascular 

Accident, Hx Seizures


Renal/ Medical History: Denies: Hx Peritoneal Dialysis


Musculoskeltal Medical History: Denies Hx Arthritis, Reports Hx Musculoskeletal 

Trauma


Skin Medical History: Reports Hx Cellulitis, Reports Hx MRSA


Traumatic Medical History: Reports: Hx Fractures


Past Surgical History: Reports: Hx Breast Surgery - augmentation, Hx Orthopedic 

Surgery - left arm.  Denies: Hx Pacemaker





- Immunizations


Immunizations up to date: Yes


Hx Diphtheria, Pertussis, Tetanus Vaccination: Yes





Physical Exam





- Vital signs


Vitals: 





                                        











Temp Pulse Resp BP Pulse Ox


 


 98.2 F   79   20   128/75 H  98 


 


 07/16/20 17:05  07/16/20 17:05  07/16/20 17:05  07/16/20 17:05  07/16/20 17:05














Course





- Vital Signs


Vital signs: 





                                        











Temp Pulse Resp BP Pulse Ox


 


 98.2 F   79   20   128/75 H  98 


 


 07/16/20 17:05  07/16/20 17:05  07/16/20 17:05  07/16/20 17:05  07/16/20 17:05

## 2020-07-16 NOTE — RADIOLOGY REPORT (SQ)
EXAM DESCRIPTION:  KNEE RIGHT 4 VIEWS



IMAGES COMPLETED DATE/TIME:  7/16/2020 5:07 pm



REASON FOR STUDY:  Pain injury and swelling.  Twisting injury.



COMPARISON:  None.



NUMBER OF VIEWS:  Four views.



TECHNIQUE:  AP, lateral, and both oblique radiographic images acquired of the right knee.



LIMITATIONS:  None.



FINDINGS:  MINERALIZATION: Normal.

BONES: No acute fracture or dislocation.  No worrisome bone lesions.

JOINT: No effusion.

SOFT TISSUES: No soft tissue swelling.  No radio-opaque foreign body.

OTHER: No other significant finding.



IMPRESSION:  NEGATIVE STUDY OF THE RIGHT KNEE. NO RADIOGRAPHIC EVIDENCE OF ACUTE INJURY.



TECHNICAL DOCUMENTATION:  JOB ID:  [id number]

 2011 [employer]- All Rights Reserved



Reading location - IP/workstation name: [id number]

## 2024-09-10 NOTE — ER DOCUMENT REPORT
ED Extremity Problem, Lower





- General


Chief Complaint: Knee Pain


Stated Complaint: KNEE PAIN


Time Seen by Provider: 07/16/20 17:43


Mode of Arrival: Wheelchair


Notes: 





30-year-old female with hx of right knee injury presents today with right knee 

pain occurring this afternoon.  States she was at work, lifting boxes, and had 

her foot planted and twisted her knee inward when she felt a popping sensation. 

Was unable to bear weight after incident.  States that she has pain bending her 

knee.  States she is also unable to bear weight at this time.  She denies any 

numbness or tingling.  States she has good sensation.  States she injured the 

same knee approximately 3 years ago.  She had seen orthopedics in the area at 

that time who states she had a torn meniscus and a torn ACL.  Patient did not 

desire to have surgery at that time. Denies any additional symptoms at this 

time.  She occasional drinker, occasional smoker.  Denies any recreational drug 

use.  Takes oral contraception but she does not know the name of the medication.


TRAVEL OUTSIDE OF THE [place] IN LAST 30 DAYS: No





- Related Data


Allergies/Adverse Reactions: 


                                        





Sulfa (Sulfonamide Antibiotics) Allergy (Verified 07/16/20 17:34)


   


sulfamethoxazole [From Bactrim] Allergy (Verified 07/16/20 17:34)


   


trimethoprim [From Bactrim] Allergy (Verified 07/16/20 17:34)


   











Past Medical History





- General


Information source: Patient





- Social History


Smoking Status: Current Some Day Smoker


Chew tobacco use (# tins/day): No


Frequency of alcohol use: Occasional


Drug Abuse: None


Family History: Hyperlipidemia, Hypertension, Malignancy.  denies: Arthritis, 

CAD, COPD, CVA, DM, Thyroid Disfunction


Patient has homicidal ideation: No





- Past Medical History


Cardiac Medical History: 


   Denies: Hx Coronary Artery Disease, Hx Heart Attack, Hx Hypertension


Pulmonary Medical History: 


   Denies: Hx Asthma, Hx Bronchitis, Hx COPD


Neurological Medical History: Reports: Hx Migraine.  Denies: Hx Cerebrovascular 

Accident, Hx Seizures


Renal/ Medical History: Denies: Hx Peritoneal Dialysis


Musculoskeletal Medical History: Denies Hx Arthritis, Reports Hx Musculoskeletal

Trauma


Skin Medical History: Reports Hx Cellulitis, Reports Hx MRSA


Traumatic Medical History: Reports: Hx Fractures


Past Surgical History: Reports: Hx Breast Surgery - augmentation, Hx Orthopedic 

Surgery - left arm.  Denies: Hx Pacemaker





- Immunizations


Immunizations up to date: Yes


Hx Diphtheria, Pertussis, Tetanus Vaccination: Yes





Review of Systems





- Review of Systems


Constitutional: No symptoms reported


EENT: No symptoms reported


Cardiovascular: No symptoms reported


Respiratory: No symptoms reported


Gastrointestinal: No symptoms reported


Genitourinary: No symptoms reported


Female Genitourinary: No symptoms reported


Musculoskeletal: See HPI


Skin: No symptoms reported


Hematologic/Lymphatic: No symptoms reported





Physical Exam





- Vital signs


Vitals: 


                                        











Temp Pulse Resp BP Pulse Ox


 


 98.2 F   79   20   128/75 H  98 


 


 07/16/20 17:05  07/16/20 17:05  07/16/20 17:05  07/16/20 17:05  07/16/20 17:05











Interpretation: Normal





- Notes


Notes: 





Adult General:





GENERAL: Alert, interacts well. No acute distress


HEAD: Normocephalic, atraumatic


EYES: Pupils equal, round and reactive to light. Extraocular movements intact.


ENT: Airway patent.  Nares patent.


NECK: Full range of motion.  Supple.  Trachea midline.  No lymphadenopathy.


LUNGS: Clear to auscultation bilaterally, no wheezes, rales, or rhonchi.  No 

respiratory distress.  Nontender chest wall.


HEART: Regular rate and rhythm.  No murmurs, rubs or gallops. 


ABDOMEN: Soft, nontender.  Nondistended.  


GENITOURINARY: Deferred


EXTREMITIES: Right knee ttp along lateral side. (-) anterior and posterior 

drawer test, (-) lachman. Pain with valgus pressure. Can flex and extend knee 

with limited ROM.  No edema, normal dorsal pedis pulses bilaterally.  No 

cyanosis.


BACK: No cervical, thoracic, lumbar midline tenderness.


NEUROLOGICAL: Alert and oriented x3.  Normal speech. Good distal sensation in 

lower extremities. Strength 5/ 5 in all extremities.


PSYCH: Normal affect, normal mood.


SKIN: Warm, dry, normal turgor.  No rashes or lesions noted.





Course





- Re-evaluation


Re-evalutation: 





07/16/20 21:07


Patient was evaluated.  I discussed with patient that x-ray shows no fracture or

dislocation.  Based on  physical exam, I suspect that she has an injury to her 

meniscus and her MCL.  Physical exam findings do not indicate that her ACL is 

torn.  I discussed with patient the need to follow-up with an orthopedic 

specialist for further evaluation and treatment.  I will go ahead and provide 

patient pain control at this time.  I discussed with patient rest, ice, 

compression and elevation to help alleviate her symptoms.  She can also use 

Tylenol and ibuprofen to help alleviate her pain.  I will discharge patient with

a leg brace and crutches. She may return to the emergency department if her 

symptoms worsen. Patient acknowledges and verbalizes understanding of instructio

ns and plan.  All questions answered.














- Vital Signs


Vital signs: 


                                        











Temp Pulse Resp BP Pulse Ox


 


 98.2 F   79   20   128/75 H  98 


 


 07/16/20 17:05  07/16/20 17:05  07/16/20 17:05  07/16/20 17:05  07/16/20 17:05














Discharge





- Discharge


Clinical Impression: 


Right knee injury


Qualifiers:


 Encounter type: initial encounter Qualified Code(s): S89.91XA - Unspecified 

injury of right lower leg, initial encounter





Acute meniscal injury of right knee


Qualifiers:


 Encounter type: initial encounter Qualified Code(s): S83.8X1A - Sprain of other

specified parts of right knee, initial encounter





Condition: Stable


Disposition: HOME, SELF-CARE


Instructions:  Use of Crutches ([facility]), Ice & Elevation ([facility]), Suspected Internal 

Knee Injury ([facility]), Knee Immobilizing Splint ([facility]), Oral Narcotic Medication 

([facility])


Additional Instructions: 


You have been treated for a knee injury.  Based on physical exam I believe you 

have a meniscal injury and an MCL sprain.  Please follow-up with an orthopedist 

as soon as possible.  You may also follow-up with your primary care as needed.  

Please use crutches as instructed.  Bear weight as able.  Please follow 

instructions as discussed to include rest, ice, elevation.  Ibuprofen can be 

used for pain relief.


Forms:  Return to Work No